# Patient Record
Sex: FEMALE | Race: ASIAN | Employment: FULL TIME | URBAN - METROPOLITAN AREA
[De-identification: names, ages, dates, MRNs, and addresses within clinical notes are randomized per-mention and may not be internally consistent; named-entity substitution may affect disease eponyms.]

---

## 2019-10-18 ENCOUNTER — OFFICE VISIT (OUTPATIENT)
Dept: FAMILY MEDICINE CLINIC | Age: 49
End: 2019-10-18

## 2019-10-18 VITALS
HEIGHT: 60 IN | HEART RATE: 68 BPM | SYSTOLIC BLOOD PRESSURE: 119 MMHG | OXYGEN SATURATION: 98 % | WEIGHT: 130 LBS | TEMPERATURE: 96.2 F | RESPIRATION RATE: 16 BRPM | DIASTOLIC BLOOD PRESSURE: 76 MMHG | BODY MASS INDEX: 25.52 KG/M2

## 2019-10-18 DIAGNOSIS — M54.50 ACUTE RIGHT-SIDED LOW BACK PAIN WITHOUT SCIATICA: ICD-10-CM

## 2019-10-18 DIAGNOSIS — Z13.29 SCREENING FOR ENDOCRINE, NUTRITIONAL, METABOLIC AND IMMUNITY DISORDER: ICD-10-CM

## 2019-10-18 DIAGNOSIS — Z13.0 SCREENING FOR ENDOCRINE, NUTRITIONAL, METABOLIC AND IMMUNITY DISORDER: ICD-10-CM

## 2019-10-18 DIAGNOSIS — Z00.00 PHYSICAL EXAM: Primary | ICD-10-CM

## 2019-10-18 DIAGNOSIS — Z12.31 VISIT FOR SCREENING MAMMOGRAM: ICD-10-CM

## 2019-10-18 DIAGNOSIS — Z13.220 SCREENING CHOLESTEROL LEVEL: ICD-10-CM

## 2019-10-18 DIAGNOSIS — Z13.21 SCREENING FOR ENDOCRINE, NUTRITIONAL, METABOLIC AND IMMUNITY DISORDER: ICD-10-CM

## 2019-10-18 DIAGNOSIS — Z83.3 FH: DIABETES MELLITUS: ICD-10-CM

## 2019-10-18 DIAGNOSIS — Z12.12 SCREENING FOR COLORECTAL CANCER: ICD-10-CM

## 2019-10-18 DIAGNOSIS — Z13.228 SCREENING FOR ENDOCRINE, NUTRITIONAL, METABOLIC AND IMMUNITY DISORDER: ICD-10-CM

## 2019-10-18 DIAGNOSIS — Z12.11 SCREENING FOR COLORECTAL CANCER: ICD-10-CM

## 2019-10-18 LAB
BILIRUB UR QL STRIP: NEGATIVE
GLUCOSE UR-MCNC: NEGATIVE MG/DL
KETONES P FAST UR STRIP-MCNC: NEGATIVE MG/DL
PH UR STRIP: 5 [PH] (ref 4.6–8)
PROT UR QL STRIP: NEGATIVE
SP GR UR STRIP: 1.03 (ref 1–1.03)
UA UROBILINOGEN AMB POC: NORMAL (ref 0.2–1)
URINALYSIS CLARITY POC: CLEAR
URINALYSIS COLOR POC: NORMAL
URINE BLOOD POC: NEGATIVE
URINE LEUKOCYTES POC: NEGATIVE
URINE NITRITES POC: NEGATIVE

## 2019-10-18 RX ORDER — CYCLOBENZAPRINE HCL 5 MG
5 TABLET ORAL
Qty: 30 TAB | Refills: 0 | Status: SHIPPED | OUTPATIENT
Start: 2019-10-18 | End: 2021-06-10

## 2019-10-18 RX ORDER — NAPROXEN 500 MG/1
500 TABLET ORAL 2 TIMES DAILY WITH MEALS
Qty: 60 TAB | Refills: 0 | Status: SHIPPED | OUTPATIENT
Start: 2019-10-18

## 2019-10-18 NOTE — PROGRESS NOTES
Chief Complaint   Patient presents with   [facility] Establish Care    LOW BACK PAIN     lower right back pain for about a month     1. Have you been to the ER, urgent care clinic since your last visit? Hospitalized since your last visit? No    2. Have you seen or consulted any other health care providers outside of the [address] [place] [address] since your last visit? Include any pap smears or colon screening.  No

## 2019-10-18 NOTE — PROGRESS NOTES
HISTORY OF PRESENT ILLNESS  Cee De Jesus is a 47 y.o. female. Patient is here to establish care. She is due to have a physical exam. I have discussed ordering mammogram and FIT kit. She would also like to have some blood work done. Patient mentions that she has started a job at [employer] and in the last 3 weeks she has been having lower back pain more so on the right side. I have discussed ordering a x-ray of lumbar spine today. I have also discussed intermittent use of heating pads and anti-inflammatory if needed. Establish Care   The history is provided by the patient. This is a new problem. The problem occurs constantly. The problem has not changed since onset. Pertinent negatives include no chest pain, no abdominal pain, no headaches and no shortness of breath. The symptoms are aggravated by bending, standing and exertion. Nothing relieves the symptoms. She has tried nothing for the symptoms. LOW BACK PAIN   The history is provided by the patient. This is a chronic problem. The problem occurs constantly. The problem has been gradually worsening. Pertinent negatives include no chest pain, no abdominal pain, no headaches and no shortness of breath. The symptoms are aggravated by stress, walking, standing and exertion. Nothing relieves the symptoms. She has tried nothing for the symptoms. Review of Systems   Constitutional: Negative for diaphoresis, fever and malaise/fatigue. HENT: Negative for congestion, ear discharge, ear pain, hearing loss, sinus pain and sore throat. Eyes: Negative for blurred vision, double vision, pain and discharge. Respiratory: Negative for cough, sputum production, shortness of breath and wheezing. Cardiovascular: Negative for chest pain, palpitations and leg swelling. Gastrointestinal: Positive for heartburn. Negative for abdominal pain, blood in stool, constipation, nausea and vomiting. Genitourinary: Negative for dysuria, frequency and hematuria. Musculoskeletal: Positive for back pain and joint pain. Neurological: Negative for tingling, focal weakness, weakness and headaches. Endo/Heme/Allergies: Negative for environmental allergies. Psychiatric/Behavioral: Negative for depression and memory loss. The patient is not nervous/anxious and does not have insomnia. Visit Vitals  /76   Pulse 68   Temp 96.2 °F (35.7 °C) (Oral)   Resp 16   Ht 5' (1.524 m)   Wt 130 lb (59 kg)   SpO2 98%   BMI 25.39 kg/m²       Physical Exam   Constitutional: She is oriented to person, place, and time. She appears well-developed and well-nourished. No distress. HENT:   Head: Normocephalic and atraumatic. Right Ear: External ear normal.   Left Ear: External ear normal.   Mouth/Throat: Oropharynx is clear and moist. No oropharyngeal exudate. Eyes: Pupils are equal, round, and reactive to light. EOM are normal. No scleral icterus. Neck: Normal range of motion. No thyromegaly present. Cardiovascular: Normal rate, regular rhythm and normal heart sounds. Pulmonary/Chest: Effort normal and breath sounds normal. No respiratory distress. She has no wheezes. Abdominal: Soft. Bowel sounds are normal. She exhibits no distension. There is no tenderness. Musculoskeletal: She exhibits tenderness. Lumbar back: She exhibits decreased range of motion, tenderness, pain and spasm. Back:    Lymphadenopathy:     She has no cervical adenopathy. Neurological: She is alert and oriented to person, place, and time. Psychiatric: She has a normal mood and affect. ASSESSMENT and PLAN  Physical exam :  1) Please make sure you have a routine physical exam every 1-2 years. 2) Annual check up with eye doctor and dentist.  3) Annual mammograms for all females starting at age of 36.  3) Self breast exam every month starting at age of 21 and above.   5) Clinical breast exam to be done every 3 years for woman between 20-30 and every year for all woman 36 and above. 6) Pap smear every 3 years starting at age 24( between 24- 27 it may be more often). At the age of 27 to 72  can switch to every 5 years with HPV screening. Woman over the age of 72 with regular cervical cancer testing with normal results no longer need testing. 7) Colorectal cancer screening with colonoscopy every ten years. 8) Bone density testing starting at the age of 72.   5) Routine blood work to be ordered as part of physical exam and has been discussed with patient. 10) Screening for STD's/HIV. 11) Exercise at least 30 min 3-5 times a week for goal BMI of less than or equal to 25.  12) Please make sure you wear a seat belt while driving daily , helmet safety discussed. 13) Please avoid smoking , alcohol and illicit drug use. 14) Daily requirement of calcium is 1200 mg per day and 1000 IU of vitamin D.  15) Please make sure all immunizations are up to date:       - Influenza vaccine every year        - Tdap every 10 years       - Pneumococcal vaccine starting at age of 72       - Shingles at age 61     Discussed mammogram and has been ordered  Fit testing has been ordered    Lower back pain :  1) Please stop heavy weight lifting and aggressive exercise for a few days to allow healing to damaged nerve roots but it is important to maintain exercises to build strength and flexibility. 2) Consider physical therapy for 6-8 weeks before exploring further treatment options. 3) Ok to use hot /cold packs depending on your body. 4) Discussed the use of pain medication and muscle relaxants , please do not take more than the reccommended amount prescribed. 5) Please have x-ray of the lower back done.

## 2019-10-19 LAB
25(OH)D3+25(OH)D2 SERPL-MCNC: 19.4 NG/ML (ref 30–100)
ALBUMIN SERPL-MCNC: 4 G/DL (ref 3.5–5.5)
ALBUMIN/GLOB SERPL: 1.5 {RATIO} (ref 1.2–2.2)
ALP SERPL-CCNC: 110 IU/L (ref 39–117)
ALT SERPL-CCNC: 12 IU/L (ref 0–32)
AST SERPL-CCNC: 13 IU/L (ref 0–40)
BASOPHILS # BLD AUTO: 0 X10E3/UL (ref 0–0.2)
BASOPHILS NFR BLD AUTO: 0 %
BILIRUB SERPL-MCNC: 0.4 MG/DL (ref 0–1.2)
BUN SERPL-MCNC: 10 MG/DL (ref 6–24)
BUN/CREAT SERPL: 19 (ref 9–23)
CALCIUM SERPL-MCNC: 8.8 MG/DL (ref 8.7–10.2)
CHLORIDE SERPL-SCNC: 104 MMOL/L (ref 96–106)
CHOLEST SERPL-MCNC: 155 MG/DL (ref 100–199)
CO2 SERPL-SCNC: 24 MMOL/L (ref 20–29)
CREAT SERPL-MCNC: 0.52 MG/DL (ref 0.57–1)
EOSINOPHIL # BLD AUTO: 0.1 X10E3/UL (ref 0–0.4)
EOSINOPHIL NFR BLD AUTO: 1 %
ERYTHROCYTE [DISTWIDTH] IN BLOOD BY AUTOMATED COUNT: 12.9 % (ref 12.3–15.4)
EST. AVERAGE GLUCOSE BLD GHB EST-MCNC: 120 MG/DL
GLOBULIN SER CALC-MCNC: 2.7 G/DL (ref 1.5–4.5)
GLUCOSE SERPL-MCNC: 85 MG/DL (ref 65–99)
HBA1C MFR BLD: 5.8 % (ref 4.8–5.6)
HCT VFR BLD AUTO: 37.4 % (ref 34–46.6)
HDLC SERPL-MCNC: 47 MG/DL
HGB BLD-MCNC: 12.3 G/DL (ref 11.1–15.9)
IMM GRANULOCYTES # BLD AUTO: 0 X10E3/UL (ref 0–0.1)
IMM GRANULOCYTES NFR BLD AUTO: 0 %
LDLC SERPL CALC-MCNC: 95 MG/DL (ref 0–99)
LYMPHOCYTES # BLD AUTO: 1.5 X10E3/UL (ref 0.7–3.1)
LYMPHOCYTES NFR BLD AUTO: 21 %
MCH RBC QN AUTO: 29.1 PG (ref 26.6–33)
MCHC RBC AUTO-ENTMCNC: 32.9 G/DL (ref 31.5–35.7)
MCV RBC AUTO: 88 FL (ref 79–97)
MONOCYTES # BLD AUTO: 0.6 X10E3/UL (ref 0.1–0.9)
MONOCYTES NFR BLD AUTO: 8 %
NEUTROPHILS # BLD AUTO: 5.1 X10E3/UL (ref 1.4–7)
NEUTROPHILS NFR BLD AUTO: 70 %
PLATELET # BLD AUTO: 271 X10E3/UL (ref 150–450)
POTASSIUM SERPL-SCNC: 4.7 MMOL/L (ref 3.5–5.2)
PROT SERPL-MCNC: 6.7 G/DL (ref 6–8.5)
RBC # BLD AUTO: 4.23 X10E6/UL (ref 3.77–5.28)
SODIUM SERPL-SCNC: 141 MMOL/L (ref 134–144)
TRIGL SERPL-MCNC: 63 MG/DL (ref 0–149)
TSH SERPL DL<=0.005 MIU/L-ACNC: 4.37 UIU/ML (ref 0.45–4.5)
VLDLC SERPL CALC-MCNC: 13 MG/DL (ref 5–40)
WBC # BLD AUTO: 7.4 X10E3/UL (ref 3.4–10.8)

## 2019-10-21 ENCOUNTER — TELEPHONE (OUTPATIENT)
Dept: FAMILY MEDICINE CLINIC | Age: 49
End: 2019-10-21

## 2019-10-24 ENCOUNTER — TELEPHONE (OUTPATIENT)
Dept: FAMILY MEDICINE CLINIC | Age: 49
End: 2019-10-24

## 2019-10-24 RX ORDER — ERGOCALCIFEROL 1.25 MG/1
50000 CAPSULE ORAL
Qty: 4 CAP | Refills: 2 | Status: SHIPPED | OUTPATIENT
Start: 2019-10-24 | End: 2020-01-22

## 2020-01-27 DIAGNOSIS — Z12.31 VISIT FOR SCREENING MAMMOGRAM: ICD-10-CM

## 2020-11-18 LAB — MAMMOGRAPHY, EXTERNAL: NORMAL

## 2021-06-10 ENCOUNTER — OFFICE VISIT (OUTPATIENT)
Dept: FAMILY MEDICINE CLINIC | Age: 51
End: 2021-06-10
Payer: MEDICAID

## 2021-06-10 VITALS
DIASTOLIC BLOOD PRESSURE: 72 MMHG | TEMPERATURE: 98.2 F | WEIGHT: 144.2 LBS | OXYGEN SATURATION: 98 % | HEART RATE: 61 BPM | RESPIRATION RATE: 18 BRPM | BODY MASS INDEX: 28.16 KG/M2 | SYSTOLIC BLOOD PRESSURE: 129 MMHG

## 2021-06-10 DIAGNOSIS — N95.1 PERIMENOPAUSE: ICD-10-CM

## 2021-06-10 DIAGNOSIS — R73.03 PREDIABETES: ICD-10-CM

## 2021-06-10 DIAGNOSIS — M54.2 NECK PAIN: Primary | ICD-10-CM

## 2021-06-10 DIAGNOSIS — Z12.11 COLON CANCER SCREENING: ICD-10-CM

## 2021-06-10 DIAGNOSIS — Z11.59 NEED FOR HEPATITIS C SCREENING TEST: ICD-10-CM

## 2021-06-10 DIAGNOSIS — E55.9 VITAMIN D DEFICIENCY: ICD-10-CM

## 2021-06-10 DIAGNOSIS — Z76.89 ENCOUNTER TO ESTABLISH CARE: ICD-10-CM

## 2021-06-10 PROBLEM — M51.36 DEGENERATION OF LUMBAR INTERVERTEBRAL DISC: Status: ACTIVE | Noted: 2020-03-18

## 2021-06-10 PROBLEM — M51.26 DISPLACEMENT OF LUMBAR INTERVERTEBRAL DISC WITHOUT MYELOPATHY: Status: ACTIVE | Noted: 2020-03-18

## 2021-06-10 PROCEDURE — 99214 OFFICE O/P EST MOD 30 MIN: CPT | Performed by: NURSE PRACTITIONER

## 2021-06-10 RX ORDER — EMOLLIENT COMBINATION NO.110
LOTION (ML) TOPICAL
COMMUNITY
End: 2022-09-19

## 2021-06-10 NOTE — PROGRESS NOTES
[address] [place] [address]               [phone]      Breana Cobos is a 45 y.o. female and presents with Establish Care       Assessment/Plan:    Diagnoses and all orders for this visit:    1. Neck pain  -     REFERRAL TO PHYSICAL THERAPY  Endorses a problem of lower neck upper back pain that has been ongoing for 3 to 4 months, [facility]-she brought those notes with her today, the x-rays showed mild degenerative disc disease C-spine and inflammation  We will start with conservative therapy continuing with use of NSAIDs, heat, ice and referring her to formal physical therapy in addition handout on home exercises and stretches she can perform have been provided in the after visit summary  2. Prediabetes  -     HEMOGLOBIN A1C WITH EAG; Future  -     METABOLIC PANEL, COMPREHENSIVE; Future  She has a past medical history of prediabetes, labs today to follow-up  3. Perimenopause  -     METABOLIC PANEL, COMPREHENSIVE; Future  -     CBC W/O DIFF; Future  She is endorsing some signs and symptoms today consistent with perimenopause, will obtain labs to evaluate her hepatic and renal function in addition to checking for anemia  4. Vitamin D deficiency  -     VITAMIN D, 25 HYDROXY; Future  Follow-up labs today  5. Need for hepatitis C screening test  -     HEPATITIS C AB; Future  Health maintenance needs addressed  6. Colon cancer screening  -     OCCULT BLOOD IMMUNOASSAY,DIAGNOSTIC; Future  Health maintenance needs addressed  7. Encounter to establish care  Visit today is to establish care  Other orders  -     METABOLIC PANEL, COMPREHENSIVE  -     CBC W/O DIFF  -     HEMOGLOBIN A1C WITH EAG  -     VITAMIN D, 25 HYDROXY  -     HEPATITIS C AB  -     OCCULT BLOOD IMMUNOASSAY,DIAGNOSTIC        Follow-up and Dispositions    · Return in about 6 months (around 12/10/2021) for PAP, vit d deficeincy, pre diabetes, office only, 30 min. Subjective:    Neck pain  Onset: 3-4 months ago  [facility], [facility], mild degenerative disc disease c-spine, showed inflammation  Recommended posture correction, Rx naproxen  Location: pain is at base of c-spine/upper t-spine,  works at [employer] and removes large boxes overhead, keeps her head leaned forward frequently while working, has since quit  Rated 3-4/10,   Aggrivating: looking down and some when looking up  Reliving: tylenol, naproxen, ice  Has not tried stretches    ROS:     ROS  As stated in HPI, otherwise all others negative. The problem list was updated as a part of today's visit. Patient Active Problem List   Diagnosis Code    Prediabetes R73.03    Vitamin D deficiency E55.9    Degeneration of lumbar intervertebral disc M51.36    Displacement of lumbar intervertebral disc without myelopathy M51.26    Perimenopause N95.1       The PSH, FH were reviewed. SH:  Social History     Tobacco Use    Smoking status: Never Smoker    Smokeless tobacco: Never Used   Substance Use Topics    Alcohol use: Never    Drug use: Never       Medications/Allergies:  Current Outpatient Medications on File Prior to Visit   Medication Sig Dispense Refill    emollient combination no.72 (Eucerin Intensive Repair) lotn Eucerin Intensive Repair lotion   apply to affected area twice a day      naproxen (NAPROSYN) 500 mg tablet Take 1 Tab by mouth two (2) times daily (with meals). 60 Tab 0     No current facility-administered medications on file prior to visit. No Known Allergies    Objective:  Visit Vitals  /72   Pulse 61   Temp 98.2 °F (36.8 °C) (Temporal)   Resp 18   Wt 144 lb 3.2 oz (65.4 kg)   SpO2 98%   BMI 28.16 kg/m²    Body mass index is 28.16 kg/m². Physical assessment  Physical Exam  Vitals and nursing note reviewed. Constitutional:       Appearance: Normal appearance. HENT:      Head: Normocephalic and atraumatic.       Right Ear: Hearing, tympanic membrane, ear canal and external ear normal.      Left Ear: Hearing, tympanic membrane, ear canal and external ear normal.      Mouth/Throat:      Mouth: Mucous membranes are moist.      Tongue: No lesions. Tongue does not deviate from midline. Pharynx: Oropharynx is clear. Eyes:      General: Lids are normal.      Conjunctiva/sclera: Conjunctivae normal.      Pupils: Pupils are equal, round, and reactive to light. Neck:      Thyroid: No thyroid mass, thyromegaly or thyroid tenderness. Vascular: No JVD. Cardiovascular:      Rate and Rhythm: Normal rate and regular rhythm. Heart sounds: Normal heart sounds. No murmur heard. No friction rub. No gallop. Pulmonary:      Effort: Pulmonary effort is normal.      Breath sounds: Normal breath sounds. Abdominal:      General: Abdomen is flat. Bowel sounds are normal.      Palpations: Abdomen is soft. Tenderness: There is no abdominal tenderness. Musculoskeletal:         General: Normal range of motion. Cervical back: Normal range of motion. Back:    Skin:     General: Skin is warm and dry. Neurological:      Mental Status: She is alert and oriented to person, place, and time. Psychiatric:         Attention and Perception: Attention normal.         Mood and Affect: Mood and affect normal.         Speech: Speech normal.         Behavior: Behavior is cooperative.          Cognition and Memory: Memory normal.         Judgment: Judgment normal.           Labwork and Ancillary Studies:    CBC w/Diff  Lab Results   Component Value Date/Time    WBC 7.4 06/10/2021 10:15 AM    HGB 11.8 06/10/2021 10:15 AM    PLATELET 171 38/24/3426 10:15 AM         Basic Metabolic Profile  Lab Results   Component Value Date/Time    Sodium 142 06/10/2021 10:15 AM    Potassium 4.7 06/10/2021 10:15 AM    Chloride 105 06/10/2021 10:15 AM    CO2 25 06/10/2021 10:15 AM    Glucose 90 06/10/2021 10:15 AM    BUN 10 06/10/2021 10:15 AM    Creatinine 0.60 06/10/2021 10:15 AM    BUN/Creatinine ratio 17 06/10/2021 10:15 AM    GFR est  06/10/2021 10:15 AM    GFR est non- 06/10/2021 10:15 AM    Calcium 9.0 06/10/2021 10:15 AM        Cholesterol  Lab Results   Component Value Date/Time    Cholesterol, total 155 10/18/2019 09:37 AM    HDL Cholesterol 47 10/18/2019 09:37 AM    LDL, calculated 95 10/18/2019 09:37 AM    Triglyceride 63 10/18/2019 09:37 AM       Health Maintenance:   Health Maintenance   Topic Date Due    PAP AKA CERVICAL CYTOLOGY  Never done    Shingrix Vaccine Age 50> (1 of 2) Never done    Flu Vaccine (Season Ended) 09/01/2021    A1C test (Diabetic or Prediabetic)  06/10/2022    Colorectal Cancer Screening Combo  06/11/2022    Breast Cancer Screen Mammogram  11/18/2022    Lipid Screen  10/18/2024    DTaP/Tdap/Td series (2 - Td or Tdap) 03/17/2031    Hepatitis C Screening  Completed    COVID-19 Vaccine  Completed    Pneumococcal 0-64 years  Aged Out       I have discussed the diagnosis with the patient and the intended plan as seen in the above orders. The patient has received an After-Visit Summary and questions were answered concerning future plans. An After Visit Summary was printed and given to the patient. All diagnosis have been discussed with the patient and all of the patient's questions have been answered. Follow-up and Dispositions    · Return in about 6 months (around 12/10/2021) for PAP, vit d deficeincy, pre diabetes, office only, 30 min. Subha Kearney, WES-BC  [address] [facility]   [address] [facility] [address]  [id number]

## 2021-06-10 NOTE — PATIENT INSTRUCTIONS
1. Between Shoulder Blades  shoudler blade ball  Instructions:    Position your body over the massage ball as to target the muscles between the shoulder blades. Apply an appropriate amount of pressure onto the ball. Slowly [place] around the target area and pause at any areas that illicit more tenderness. Once you find a tender spot, move you arm up/down to increase the release. Repeat on other side. 2. Chest  ball chest  Instructions:    Position your body over a massage ball so that your chest muscles are targeted. Apply appropriate amount of pressure onto the ball. Slowly [place] around the target area and pause at any areas that illicit more tenderness. Once you have found a painful spot, move your arm around to increase the amount of release in the area. Repeat on other side. 3. Latissimus Dorsi  latfoam  Instructions:    Place a foam roller on the floor. Position your body on top of the foam roller so that it is in direct contact with the latissimus dorsi. (see above)  Apply an appropriate amount of pressure onto the foam roller. Slowly move around the target area and pause at any areas that illicit more tenderness. You may want to move your arm around to increase the release. Repeat on other side. 4. Upper Abdominals  ball upper abs    Instructions:    Position your body over a massage ball as to target the upper abdominal region. Apply a small amount of pressure onto the ball. Slowly [place] around the target area and pause at any areas that illicit more tenderness. Note: Please take care with this release. Excessive pressure in this region can compress your organs! Repeat on other side. 5. Intercostals  Instructions: With your finger, feel the gaps between your ribs. Starting from the outer side of your ribs, trace this gap towards the mid line. Apply appropriate amount of pressure through your finger tip.   Once you have found a tender area, maintain the finger tip pressure and take 3x breaths in/out. Make sure to cover as many ribs as you can locate. B) Stretches  Now that you have released your muscles that influence the thoracic spine, the next step is to stretch! Guideline:    Hold each stretch for a minimum of 30-45 seconds. Repeat 2-3 times. Make sure you feel the stretch in the targeted areas. 6. Side Stretch  Side stretch  This stretch predominantly targets the latissimus dorsi muscle. Instructions: Whilst standing, reach over and bend to the side. Aim to feel the stretch on the side of your body. Do not let your torso rotate. Repeat on the other side. 7. Front Stretch  Chest stretch    This stretch predominantly targets the pectoralis (chest) muscle. Instructions:    Place your outstretched hand on a door frame. Lunge forward. Pull your shoulder blades backwards. Do not arch your lower back. (Keep rib cage low.)  Aim to feel a stretch at the front of your chest.  Repeat on the other side. 8. Intercostals  These muscles are situated between the ribs. (Surprisingly, they can actually get pretty tight!)    intercostal stretch   I call this my sexy pose :)    Instructions:    Lie on your side whilst leaning on your elbow  [place] your body to the upper side. Aim to feel a stretch at the side of your rib cage. Aim to take deep breaths into the area of stretch. Repeat on the other side. 9. Posterior Line Stretch  Posterior line stretch    Instructions: Whilst sitting down, pull your head down and bring your chin closer to your upper chest.  Bend as far forward as possible whilst making sure to round the upper back. Aim to feel a stretch at the back between the shoulder blades. Take deep breaths into the area of stretch. C) Mobility  If you have completed the releases and stretches, your thoracic spine should feel much looser now.  Lets get moving! Note:    You may feel/hear clicks when you perform these mobility exercises.   (This is just the release of pressure in your joints.)  Aim to move your spine as much as possible without compensating with other joints. 10. Extension  Thoracic extension    Instructions:    Place a foam roller (a rolled up towel will work too) on the floor. Lie down on the ground and position the foam roll so that it is in the middle of your upper back. Stretch arms over head and arch backwards. Keep your lower ribs down to prevent over arching of the lower back. Oscillate this motion for 30 repetitions. 11. Flexion  flat round back  Instructions:    Get into the 4 point kneel position. Proceed to round your upper back. Aim to feel a gentle stretch at the back as your elongate your spine. Return to the starting position. Alternate between these 2 positions for 30 repetitions. Progression: Try to round your upper back one vertebra at a time. (aka Segmental control)  12. Rotation  rotation  Instructions:    Sit down on a chair. Place your hand on the outer side of the opposite knee  With the other hand, grab onto the back of the chair. Rotate your spine. (as to look behind you)  Oscillate in this position for 30 repetitions. Repeat on the other side. 13. Translations  thoracic translation  Instructions: Whilst sitting, slide your shoulders to the side. Aim to keep your shoulders level whilst performing this movement. Alternate sides for 30 repetitions. D) Strengthening  14. Wall Nando  nando 1 nando 2  Instructions:    Stand with your back to a wall. Keep your back and arms pulled backwards as to remain in contact with the wall throughout movements. Place your arms in the W starting position. Transition to I position. Aim to feel the contraction in the muscles between the shoulder blade. Repeat 10-20 times. 15. Parallel Nando  horizontal retraction  Instructions:    Support your chest on a stool. (as to keep your body parallel with the floor)  Place your arms in the W starting position.   Transition to I position. Keep your back muscles pulled backwards throughout all movements. Repeat 10-20 times. 16. Side Bends  Instructions: Whilst standing, hold onto weights in your hands. Use an appropriate weight. Proceed to bend all the way to the side. Make sure that you do not twist your body. Hold for 5 seconds. Alternate sides. Repeat 20 times. Note: Increase the weight when you are ready to progress the exercise. 17. Rotation In 4 Point Kneel  Instructions:    Get into the 4 point kneel position. Place one hand behind your head. Proceed to twist your body. Aim to get the elbow pointing towards the roof. Gently brace your abdominal muscles. Keep your ribs cage low. Do not flare you ribs out. Hold for 5 seconds. Repeat 20 times. Repeat exercise on the other side. Neck Arthritis: Exercises  Introduction  Here are some examples of exercises for you to try. The exercises may be suggested for a condition or for rehabilitation. Start each exercise slowly. Ease off the exercises if you start to have pain. You will be told when to start these exercises and which ones will work best for you. How to do the exercises  Neck stretches to the side   1. This stretch works best if you keep your shoulder down as you lean away from it. To help you remember to do this, start by relaxing your shoulders and lightly holding on to your thighs or your chair. 2. Tilt your head toward your shoulder and hold for 15 to 30 seconds. Let the weight of your head stretch your muscles. 3. Repeat 2 to 4 times toward each shoulder. Chin tuck   1. Lie on the floor with a rolled-up towel under your neck. Your head should be touching the floor. 2. Slowly bring your chin toward your chest.  3. Hold for a count of 6, and then relax for up to 10 seconds. 4. Repeat 8 to 12 times. Active cervical rotation   1. Sit in a firm chair, or stand up straight.   2. Keeping your chin level, turn your head to the right, and hold for 15 to 30 seconds. 3. Turn your head to the left and hold for 15 to 30 seconds. 4. Repeat 2 to 4 times to each side. Shoulder blade squeeze   1. While standing, squeeze your shoulder blades together. 2. Do not raise your shoulders up as you are squeezing. 3. Hold for 6 seconds. 4. Repeat 8 to 12 times. Shoulder rolls   1. Sit comfortably with your feet shoulder-width apart. You can also do this exercise standing up. 2. Roll your shoulders up, then back, and then down in a smooth, circular motion. 3. Repeat 2 to 4 times. Follow-up care is a key part of your treatment and safety. Be sure to make and go to all appointments, and call your doctor if you are having problems. It's also a good idea to know your test results and keep a list of the medicines you take. Where can you learn more? Go to http://www.gray.com/  Enter X003 in the search box to learn more about \"Neck Arthritis: Exercises. \"  Current as of: November 16, 2020               Content Version: 12.8  © 8542-9268 [employer]. Care instructions adapted under license by [employer] (which disclaims liability or warranty for this information). If you have questions about a medical condition or this instruction, always ask your healthcare professional. [address] any warranty or liability for your use of this information. Neck Arthritis: Exercises  Introduction  Here are some examples of exercises for you to try. The exercises may be suggested for a condition or for rehabilitation. Start each exercise slowly. Ease off the exercises if you start to have pain. You will be told when to start these exercises and which ones will work best for you. How to do the exercises  Neck stretches to the side   4. This stretch works best if you keep your shoulder down as you lean away from it.  To help you remember to do this, start by relaxing your shoulders and lightly holding on to your thighs or your chair. 5. Tilt your head toward your shoulder and hold for 15 to 30 seconds. Let the weight of your head stretch your muscles. 6. Repeat 2 to 4 times toward each shoulder. Chin tuck   5. Lie on the floor with a rolled-up towel under your neck. Your head should be touching the floor. 6. Slowly bring your chin toward your chest.  7. Hold for a count of 6, and then relax for up to 10 seconds. 8. Repeat 8 to 12 times. Active cervical rotation   5. Sit in a firm chair, or stand up straight. 6. Keeping your chin level, turn your head to the right, and hold for 15 to 30 seconds. 7. Turn your head to the left and hold for 15 to 30 seconds. 8. Repeat 2 to 4 times to each side. Shoulder blade squeeze   5. While standing, squeeze your shoulder blades together. 6. Do not raise your shoulders up as you are squeezing. 7. Hold for 6 seconds. 8. Repeat 8 to 12 times. Shoulder rolls   4. Sit comfortably with your feet shoulder-width apart. You can also do this exercise standing up. 5. Roll your shoulders up, then back, and then down in a smooth, circular motion. 6. Repeat 2 to 4 times. Follow-up care is a key part of your treatment and safety. Be sure to make and go to all appointments, and call your doctor if you are having problems. It's also a good idea to know your test results and keep a list of the medicines you take. Where can you learn more? Go to http://adarsh-michael.info/  Enter U895 in the search box to learn more about \"Neck Arthritis: Exercises. \"  Current as of: November 16, 2020               Content Version: 12.8  © 4184-2146 [employer]. Care instructions adapted under license by [employer] (which disclaims liability or warranty for this information).  If you have questions about a medical condition or this instruction, always ask your healthcare professional. [employer] disclaims any warranty or liability for your use of this information.

## 2021-06-11 LAB
25(OH)D3+25(OH)D2 SERPL-MCNC: 20.6 NG/ML (ref 30–100)
ALBUMIN SERPL-MCNC: 4.1 G/DL (ref 3.8–4.9)
ALBUMIN/GLOB SERPL: 1.2 {RATIO} (ref 1.2–2.2)
ALP SERPL-CCNC: 113 IU/L (ref 48–121)
ALT SERPL-CCNC: 12 IU/L (ref 0–32)
AST SERPL-CCNC: 15 IU/L (ref 0–40)
BILIRUB SERPL-MCNC: 0.3 MG/DL (ref 0–1.2)
BUN SERPL-MCNC: 10 MG/DL (ref 6–24)
BUN/CREAT SERPL: 17 (ref 9–23)
CALCIUM SERPL-MCNC: 9 MG/DL (ref 8.7–10.2)
CHLORIDE SERPL-SCNC: 105 MMOL/L (ref 96–106)
CO2 SERPL-SCNC: 25 MMOL/L (ref 20–29)
CREAT SERPL-MCNC: 0.6 MG/DL (ref 0.57–1)
ERYTHROCYTE [DISTWIDTH] IN BLOOD BY AUTOMATED COUNT: 13.2 % (ref 11.7–15.4)
EST. AVERAGE GLUCOSE BLD GHB EST-MCNC: 123 MG/DL
GLOBULIN SER CALC-MCNC: 3.3 G/DL (ref 1.5–4.5)
GLUCOSE SERPL-MCNC: 90 MG/DL (ref 65–99)
HBA1C MFR BLD: 5.9 % (ref 4.8–5.6)
HCT VFR BLD AUTO: 37 % (ref 34–46.6)
HCV AB S/CO SERPL IA: <0.1 S/CO RATIO (ref 0–0.9)
HGB BLD-MCNC: 11.8 G/DL (ref 11.1–15.9)
MCH RBC QN AUTO: 27.9 PG (ref 26.6–33)
MCHC RBC AUTO-ENTMCNC: 31.9 G/DL (ref 31.5–35.7)
MCV RBC AUTO: 88 FL (ref 79–97)
PLATELET # BLD AUTO: 304 X10E3/UL (ref 150–450)
POTASSIUM SERPL-SCNC: 4.7 MMOL/L (ref 3.5–5.2)
PROT SERPL-MCNC: 7.4 G/DL (ref 6–8.5)
RBC # BLD AUTO: 4.23 X10E6/UL (ref 3.77–5.28)
SODIUM SERPL-SCNC: 142 MMOL/L (ref 134–144)
WBC # BLD AUTO: 7.4 X10E3/UL (ref 3.4–10.8)

## 2021-06-18 LAB — HEMOCCULT STL QL IA: NEGATIVE

## 2021-09-23 DIAGNOSIS — E55.9 VITAMIN D DEFICIENCY: Primary | ICD-10-CM

## 2021-09-23 RX ORDER — ASPIRIN 325 MG
50000 TABLET, DELAYED RELEASE (ENTERIC COATED) ORAL
Qty: 12 CAPSULE | Refills: 1 | Status: SHIPPED | OUTPATIENT
Start: 2021-09-23 | End: 2022-09-19

## 2021-09-24 ENCOUNTER — TELEPHONE (OUTPATIENT)
Dept: FAMILY MEDICINE CLINIC | Age: 51
End: 2021-09-24

## 2021-09-24 NOTE — TELEPHONE ENCOUNTER
Mr. Haydee Campos was called in regards Ms. Sung Max lab results. No answer. Left message to return call for lab results.

## 2021-11-01 ENCOUNTER — OFFICE VISIT (OUTPATIENT)
Dept: FAMILY MEDICINE CLINIC | Age: 51
End: 2021-11-01
Payer: MEDICAID

## 2021-11-01 VITALS
DIASTOLIC BLOOD PRESSURE: 71 MMHG | SYSTOLIC BLOOD PRESSURE: 114 MMHG | BODY MASS INDEX: 28.03 KG/M2 | HEART RATE: 70 BPM | OXYGEN SATURATION: 98 % | TEMPERATURE: 98.4 F | WEIGHT: 142.8 LBS | RESPIRATION RATE: 18 BRPM | HEIGHT: 60 IN

## 2021-11-01 DIAGNOSIS — B07.0 PLANTAR WART: Primary | ICD-10-CM

## 2021-11-01 PROCEDURE — 99213 OFFICE O/P EST LOW 20 MIN: CPT | Performed by: NURSE PRACTITIONER

## 2021-11-01 NOTE — PROGRESS NOTES
Room    Did patient bring someone? Yes: Comment: Son in law meet cantu    Did the patient have DME equipment? No     Did you take your medication today? No       1. Have you been to the ER, urgent care clinic since your last visit? Hospitalized since your last visit? No    2. Have you seen or consulted any other health care providers outside of the [address] [place] [address] since your last visit? Include any pap smears or colon screening.  No      Health Maintenance Due   Topic Date Due    Cervical cancer screen  Never done    Shingrix Vaccine Age 50> (1 of 2) Never done    Flu Vaccine (1) 09/01/2021

## 2021-11-01 NOTE — PROGRESS NOTES
[address] [place] [address]               [phone]      Juice Martin is a 45 y.o. female and presents with No chief complaint on file. Assessment/Plan:    Diagnoses and all orders for this visit:    1. Plantar wart  -     REFERRAL TO PODIATRY    referral to podiatry for further management  Patient verbalized understanding and is in agreement with this plan of care    Follow-up and Dispositions    · Return for keep previously scheduled follow up. Health Maintenance:   Health Maintenance   Topic Date Due    Cervical cancer screen  Never done    Shingrix Vaccine Age 49> (1 of 2) Never done    Flu Vaccine (1) 09/01/2021    A1C test (Diabetic or Prediabetic)  06/10/2022    Colorectal Cancer Screening Combo  06/11/2022    Breast Cancer Screen Mammogram  11/18/2022    Lipid Screen  10/18/2024    DTaP/Tdap/Td series (2 - Td or Tdap) 03/17/2031    Hepatitis C Screening  Completed    COVID-19 Vaccine  Completed    Pneumococcal 0-64 years  Aged Out        Subjective:    Plantar wart  Left foot  Onset: about 10 months ago  Tried OTC treatments, made it better but it came back  Causes pain with walking  deneis bleeding or drainage    ROS:     ROS  As stated in HPI, otherwise all others negative. The problem list was updated as a part of today's visit. Patient Active Problem List   Diagnosis Code    Prediabetes R73.03    Vitamin D deficiency E55.9    Degeneration of lumbar intervertebral disc M51.36    Displacement of lumbar intervertebral disc without myelopathy M51.26    Perimenopause N95.1       The PSH, FH were reviewed.       SH:  Social History     Tobacco Use    Smoking status: Never Smoker    Smokeless tobacco: Never Used   Substance Use Topics    Alcohol use: Never    Drug use: Never       Medications/Allergies:  Current Outpatient Medications on File Prior to Visit   Medication Sig Dispense Refill    cholecalciferol (VITAMIN D3) (50,000 UNITS /1250 MCG) capsule Take 1 Capsule by mouth every seven (7) days. 12 Capsule 1    emollient combination no.72 (Eucerin Intensive Repair) lotn Eucerin Intensive Repair lotion   apply to affected area twice a day      naproxen (NAPROSYN) 500 mg tablet Take 1 Tab by mouth two (2) times daily (with meals). (Patient not taking: Reported on 11/1/2021) 60 Tab 0     No current facility-administered medications on file prior to visit. No Known Allergies    Objective:  Visit Vitals  /71   Pulse 70   Temp 98.4 °F (36.9 °C) (Temporal)   Resp 18   Ht 5' (1.524 m)   Wt 142 lb 12.8 oz (64.8 kg)   SpO2 98%   BMI 27.89 kg/m²    Body mass index is 27.89 kg/m². Physical assessment  Physical Exam  Musculoskeletal:        Feet:    Feet:      Comments: Firm palpable area about the size of a quarter, center lesion of dried skin, no drainage          Labwork and Ancillary Studies:    CBC w/Diff  Lab Results   Component Value Date/Time    WBC 7.4 06/10/2021 10:15 AM    HGB 11.8 06/10/2021 10:15 AM    PLATELET 681 48/49/9160 10:15 AM         Basic Metabolic Profile  Lab Results   Component Value Date/Time    Sodium 142 06/10/2021 10:15 AM    Potassium 4.7 06/10/2021 10:15 AM    Chloride 105 06/10/2021 10:15 AM    CO2 25 06/10/2021 10:15 AM    Glucose 90 06/10/2021 10:15 AM    BUN 10 06/10/2021 10:15 AM    Creatinine 0.60 06/10/2021 10:15 AM    BUN/Creatinine ratio 17 06/10/2021 10:15 AM    GFR est  06/10/2021 10:15 AM    GFR est non- 06/10/2021 10:15 AM    Calcium 9.0 06/10/2021 10:15 AM        Cholesterol  Lab Results   Component Value Date/Time    Cholesterol, total 155 10/18/2019 09:37 AM    HDL Cholesterol 47 10/18/2019 09:37 AM    LDL, calculated 95 10/18/2019 09:37 AM    Triglyceride 63 10/18/2019 09:37 AM           I have discussed the diagnosis with the patient and the intended plan as seen in the above orders.   The patient has received an After-Visit Summary and questions were answered concerning future plans. An After Visit Summary was printed and given to the patient. All diagnosis have been discussed with the patient and all of the patient's questions have been answered. Follow-up and Dispositions    · Return for keep previously scheduled follow up. Aubrey Pa, [facility]  [address]  [id number]

## 2022-02-14 LAB — MAMMOGRAPHY, EXTERNAL: NEGATIVE

## 2022-03-19 PROBLEM — E55.9 VITAMIN D DEFICIENCY: Status: ACTIVE | Noted: 2021-06-10

## 2022-03-19 PROBLEM — M51.26 DISPLACEMENT OF LUMBAR INTERVERTEBRAL DISC WITHOUT MYELOPATHY: Status: ACTIVE | Noted: 2020-03-18

## 2022-03-19 PROBLEM — M51.36 DEGENERATION OF LUMBAR INTERVERTEBRAL DISC: Status: ACTIVE | Noted: 2020-03-18

## 2022-03-19 PROBLEM — R73.03 PREDIABETES: Status: ACTIVE | Noted: 2021-06-10

## 2022-03-20 PROBLEM — N95.1 PERIMENOPAUSE: Status: ACTIVE | Noted: 2021-06-10

## 2022-06-10 NOTE — PROGRESS NOTES
Patient is having neck 4/10 pain      1. Have you been to the ER, urgent care clinic since your last visit? Hospitalized since your last visit? [facility] pain     2. Have you seen or consulted any other health care providers outside of the [address] [place] [address] since your last visit? Include any pap smears or colon screening.  No     Health Maintenance Due   Topic Date Due    Hepatitis C Screening  Never done    DTaP/Tdap/Td series (1 - Tdap) Never done    PAP AKA CERVICAL CYTOLOGY  Never done    Shingrix Vaccine Age 50> (1 of 2) Never done    Colorectal Cancer Screening Combo  Never done 4 = No assist / stand by assistance

## 2022-09-19 ENCOUNTER — OFFICE VISIT (OUTPATIENT)
Dept: SPORTS MEDICINE | Age: 52
End: 2022-09-19
Payer: COMMERCIAL

## 2022-09-19 ENCOUNTER — APPOINTMENT (OUTPATIENT)
Dept: FAMILY MEDICINE CLINIC | Age: 52
End: 2022-09-19

## 2022-09-19 DIAGNOSIS — M25.50 POLYARTHRALGIA: Primary | ICD-10-CM

## 2022-09-19 DIAGNOSIS — M79.662 PAIN IN LEFT LOWER LEG: ICD-10-CM

## 2022-09-19 DIAGNOSIS — M25.50 POLYARTHRALGIA: ICD-10-CM

## 2022-09-19 DIAGNOSIS — M25.532 LEFT WRIST PAIN: ICD-10-CM

## 2022-09-19 DIAGNOSIS — M77.9 TENDINITIS: ICD-10-CM

## 2022-09-19 DIAGNOSIS — M79.642 LEFT HAND PAIN: ICD-10-CM

## 2022-09-19 DIAGNOSIS — M25.511 ACUTE PAIN OF RIGHT SHOULDER: ICD-10-CM

## 2022-09-19 PROCEDURE — 99204 OFFICE O/P NEW MOD 45 MIN: CPT | Performed by: FAMILY MEDICINE

## 2022-09-19 RX ORDER — PREDNISONE 10 MG/1
TABLET ORAL
Qty: 30 TABLET | Refills: 0 | Status: SHIPPED | OUTPATIENT
Start: 2022-09-19

## 2022-09-19 RX ORDER — ACETAMINOPHEN 500 MG
500 TABLET ORAL
COMMUNITY
Start: 2022-09-16 | End: 2022-09-26

## 2022-09-19 NOTE — PROGRESS NOTES
[facility]  Sports Medicine Consultation Note    PCP: Judge Kylah NP  Requesting provider: Judge Kylah NP       Kimberlee Simeon is a 47 y.o. female (: 1970) presenting to obtain consultative services regarding:  Chief Complaint   Patient presents with    Shoulder Pain     Right         Assessment/Plan:       ICD-10-CM ICD-9-CM   1. Polyarthralgia  M25.50 719.49   2. Tendinitis  M77.9 726.90   3. Acute pain of right shoulder  M25.511 719.41   4. Left hand pain  M79.642 729.5   5. Left wrist pain  M25.532 719.43   6. Pain in left lower leg  M79.662 729.5       Discussion:  51yo female with no significant PMHx aside from an A1c of 5.9 in 2021 presents with her son (an RN @ [employer] [facility]) for evaluation of 2-3 week history of soft tissue / joint pain, swelling and warmth. Prior to 1mo ago, no issues with joint pain. Also no family history of autoimmune problems. It began with atraumatic LEFT anterolateral sharp hip pain that caused her difficulty with active LEFT hip flexion and abduction but resolved within 1-2 days. Then 1 week ago had sharp LEFT wrist pain (points to radial aspect / first extensor compartment) that was associated with warmth and swelling. Was evaluated @ [facility], reportedly with negative radiograph(s), with a DDx of cellulitis vs gout, and was provided rx for  both indomethacin & keflex. As the pain and swelling was responsive to indomethacin, she never did take the ABx. Subsequently has a serum uric acid level drawn, which was well within normal limits. After the resolution of the LEFT wrist pain, 3 days ago had sudden onset of atraumatic RIGHT shoulder pain with any active movement. The pain was so significant that she sought emergent evaluation in the ED, had radiograph(s) which were only significant for calcific tendinopathy.     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 Plan:  Discussed further diagnostic and management options with both Kevin Prado and her son. Given the subacute history (2-3wks), severity of symptoms (limiting AROM), inflammatory symptoms and migratory asymmetric evolution (LEFT anterior hip -> LEFT wrist -> RIGHT shoulder) without evidence of infection, suspect autoimmune etiology. > autoimmune serologies  > trial of oral steroids. Discussed with pt & son re: importance of monitoring for clinical response. > referral to rheumatology for further workup.          Orders Placed This Encounter    STEVE COMPREHENSIVE PLUS PANEL     Standing Status:   Future     Number of Occurrences:   1     Standing Expiration Date:   0/12/5722    CYCLIC CITRUL PEPTIDE AB, IGG     Standing Status:   Future     Number of Occurrences:   1     Standing Expiration Date:   9/19/2023    C REACTIVE PROTEIN, QT     Standing Status:   Future     Number of Occurrences:   1     Standing Expiration Date:   9/19/2023    SED RATE (ESR)     Standing Status:   Future     Number of Occurrences:   1     Standing Expiration Date:   9/19/2023    RHEUMATOID FACTOR, IGM     Standing Status:   Future     Number of Occurrences:   1     Standing Expiration Date:   9/20/2023    CBC WITH AUTOMATED DIFF     Standing Status:   Future     Number of Occurrences:   1     Standing Expiration Date:   9/19/2023    REFERRAL TO RHEUMATOLOGY     Referral Priority:   Routine     Referral Type:   Consultation     Referral Reason:   Specialty Services Required     Referred to Provider:   Radha Hamilton MD     Number of Visits Requested:   1    predniSONE (DELTASONE) 10 mg tablet     Simg PO daily x3d, then 30mg PO daily x3d, then 20mg PO daily x3d, then 10mg PO daily x3d, then stop. Dispense:  30 Tablet     Refill:  0           Management plan & patient instructions discussed with Lillie Pichardo, who voiced understanding. Thank you for the opportunity to participate in the care of this patient. If any questions or concerns at all, please feel free to contact me. This document may have been created with the aid of dictation software. Text may contain errors, particularly phonetic errors. Elizabeth Pollard MD  Internal Medicine, Family Medicine & Sports Medicine  2022      Subjective   History:     I was asked to provide consultative services by self-referral for advice/opinion related to evaluating    Chief Complaint   Patient presents with    Shoulder Pain     Right         Lillie Pichardo is a 47 y.o. female   RIGHT hand dominant    # LEFT hip pain  Atraumatic, started suddenly  Difficulty with flexion  Pain was anterior aspect  Self-resolved within 1-2 days  Never had any issues like this before      # LEFT wrist pain  atraumatic  [facility]  + mildly warm to the touch  Son reports that UC was thinking \"cellulitis vs gout\"  Was given keflex & indomethacin. Pain improved with indomethacin, so never took the keflex. Now has some discomfort palmar aspect of LEFT hand (across MCPs)  Never had any issues like this before  Had sUA done which was within normal limits       # RIGHT shoulder pain  3 days of pain, slightly improved with NSAIDs from ED  No injury or fall  5 of 10, throbbing  No history of similar symptoms  Feels most comfortable in the fully adducted and IR position  No numbness/tingling   No swelling/redness/warmth       # LEFT lower leg pain  Just distal to medial aspect of knee  Per son Jade Fletcher one point, she was saying it was quite painful to the touch, but never had any bruising or swelling.   Then eventually it wasn't painful anymore, but just felt less sensation, like it was almost numb in that area\"      Current Medications for this complaint:  APAP  IBU  Ice    Previous work-up for this complaint has included:   RIGHT shoulder radiograph(s)      History reviewed. No pertinent past medical history. History reviewed. No pertinent surgical history. reports that she has never smoked. She has never used smokeless tobacco. She reports that she does not drink alcohol and does not use drugs. Family History   Problem Relation Age of Onset    Diabetes Mother     Hypertension Mother     Diabetes Brother     Other Father      No Known Allergies    Problem List:      Patient Active Problem List    Diagnosis    Prediabetes     2019: A1C 5.8%        Vitamin D deficiency    Perimenopause     Started 2019      Degeneration of lumbar intervertebral disc    Displacement of lumbar intervertebral disc without myelopathy       Medications:     Current Outpatient Medications on File Prior to Visit   Medication Sig Dispense Refill    naproxen (NAPROSYN) 500 mg tablet Take 1 Tab by mouth two (2) times daily (with meals). 60 Tab 0     No current facility-administered medications on file prior to visit. Objective   Physical Assessment:   VS:    Vitals:    09/19/22 1243   PainSc:   5   PainLoc: Shoulder       Physical Exam  Nursing note reviewed. Constitutional:       General: She is not in acute distress. Appearance: Normal appearance. She is well-developed. She is not diaphoretic. Comments: Holding RIGHT arm (using LEFT arm) in fully adducted and internally rotated across chest   HENT:      Head: Normocephalic and atraumatic. Mouth/Throat:      Comments: Mask in place  Eyes:      Conjunctiva/sclera: Conjunctivae normal.   Musculoskeletal:      Right shoulder: Tenderness (LHBT and anterior musculature) present. No swelling, deformity or bony tenderness.  Decreased range of motion (significantly limited AROM 2/2 p!; PROM near full). Left shoulder: No swelling, deformity, tenderness or bony tenderness. Normal range of motion. Right wrist: No effusion, tenderness, bony tenderness or snuff box tenderness. Normal range of motion. Left wrist: Swelling (mild, without erythema) present. No effusion, tenderness, bony tenderness or snuff box tenderness. Normal range of motion. Cervical back: Full passive range of motion without pain and neck supple. Right hip: No tenderness or bony tenderness. Normal range of motion. Normal strength. Left hip: No tenderness or bony tenderness. Normal range of motion. Normal strength. Right knee: No swelling or effusion. Normal range of motion. No tenderness. Left knee: No swelling or effusion. Normal range of motion. No tenderness. Legs:       Comments: Neg tinel @ bilateral elbows and wrists   Skin:     General: Skin is warm and dry. Findings: No rash. Neurological:      Mental Status: She is alert and oriented to person, place, and time. Gait: Gait normal.   Psychiatric:         Behavior: Behavior normal. Behavior is cooperative. Thought Content: Thought content normal.       Recent Labs & Imaging:       XR R shoulder (9/16/2022):   1. No acute osseous abnormality. 2. Small calcific tendinosis adjacent to the greater tuberosity. Results from Appointment encounter on 10/18/19    XR SPINE LUMB 2 OR 3 V    Narrative  Lumbar spine 2 views views. Indication: Lower back pain. Findings:    Limited Ap and lateral 2 view evaluation of the lumbar spine demonstrates  transitional lumbosacral S1 body. Normal anatomic alignment with normal disc  spaces. There is no evidence of fracture or subluxation. Osseous  mineralization is normal.  Soft tissue structures are normal.  The bowel gas  pattern is unremarkable. Impression    Transitional S1 body. No significant radiographic abnormality identified.       MRI Results (maximum last 3):  Results from Hospital Encounter encounter on 03/04/20    MRI LUMB SPINE WO CONT    Narrative  EXAMINATION: MRI LUMB SPINE WO CONT    CLINICAL INDICATION:     Lower back pain    COMPARISON:  None. TECHNIQUE: T1 and T2-weighted sagittal images of the lumbar spine were obtained  in multiple planes without contrast.    FINDINGS:  Conus medullaris tip: T12/L1  Alignment: Normal  Marrow:  Normal L1 vertebral body hemangioma  Compression deformities:  Normal  Miscellaneous findings:  None. Level by level analysis:  T12-L1, normal disc. Mild ligamentum flavum and facet hypertrophy. L1-2, Normal disc. No stenosis. L2-3, normal disc. Mild ligamentum flavum and facet hypertrophy. No stenosis. L3-4, mild disc bulge. Mild ligamentum flavum and facet hypertrophy. Mild  bilateral neural foraminal stenosis. No central spinal canal stenosis. L4-5, mild disc bulge. Moderate ligamentum flavum and facet atrophy. Mild  bilateral neural foraminal stenosis. Mild central spinal canal stenosis. L5-S1, circumferential disc bulge. Hypoplasia lumbar canal. Mild ligamentum  flavum and facet hypertrophy. Mild encroachment upon the lateral recess  bilaterally. Impression  IMPRESSION:  Disc bulges. No focal disc protrusion or high-grade stenosis. Review of Previous Medical Records:     ED note (9/16/2022): ARM PAIN     53-year-old female with no significant medical history presents to the ED for evaluation of right shoulder pain that began last night. Patient describes her pain as 8/10 and throbbing that is localized to the right shoulder. Patient does not recall any falls or mechanisms that may have incited her pain. She notes the pain is worse with any type of movement and has been holding the shoulder still at her side since last night. She has tried taking naproxen and indomethacin without significant relief of symptoms.  Patient states she has had similar types of pain recently in the bilateral knees, hands that lasted for a couple of days and was alleviated with naproxen and indomethacin. Patient states she had a uric acid level performed at an outside urgent care that was reportedly negative during these episodes. She has not followed up with her PCP for this yet. She denies any numbness, tingling, redness, swelling, wounds, rashes, chest pain, dyspnea, fevers, chills.

## 2022-09-19 NOTE — PATIENT INSTRUCTIONS
To do: - labs at the main office (down the jaim[address], last door on the right)  - start the oral steroids. .. only in the morning, with food! At minimum an 8 day taper. Pay attention to how your symptoms respond. The rheumatology office should be calling you to schedule in the next 1-2 weeks. If you don't hear from them after 2 weeks, call their office directly to check on the status of your referral.       VISIT SURVEY       You may receive a survey regarding your visit today either by mail or email. Please take the opportunity to let us know how we did. This information helps us continue to improve and provide a great patient experience. TESTING / IMAGING RESULTS       If you have [employer] access:  Per federal regulations all of your results will be released to you and to your physician / provider simultaneously on [address] This means that it is likely that you will have the opportunity to review your results before your physician / provider! Since all \"critical\" abnormal results are immediately called to the office / on-call providers on nights, weekends and holidays - please refrain from calling after hours when you receive abnormal results through [address] Instead, allow time for your physician / provider to review your results and then provide interpretation and/or guidance. If the results are significantly abnormal and require time-sensitive action - guidance will be provided both via [employer] and via telephone. For all other results (interpreted as \"normal\", \"abnormal but expected\", \"reassuring / stable\", \"slightly abnormal\") - non-urgent guidance will be provided via [employer] and [employer] communication only. [employer] Help Desk # [phone]    If you do NOT have [employer] access: If the results are significantly abnormal and require time-sensitive action - guidance will be relayed to you via telephone.   For all other results (interpreted as \"normal\", \"abnormal but expected\", \"reassuring / stable\", \"slightly abnormal\") - non-urgent guidance will be mailed to you via [employer]      Results from Outside Facilities / Laboratories:  Results of tests performed at outside facilities / laboratories likely will not appear in the [employer]. They may appear in the patient portals of those outside facilities / laboratories. Please keep in mind that with your access to your patient portal directly to an outside facility / laboratory, you are likely viewing results before your physician / provider! Please allow time for your physician / provider to review your results and then provide interpretation and/or guidance. If you have questions about your results beyond the guidance provided in MyChart or in your results letter, please schedule a follow up appointment to discuss with your physician / provider. MEDICATION REFILLS        Please request medication refills through your pharmacy, to ensure the correct pharmacy is used. Please allow at least 3 business days for refill requests to be addressed. Refills will not be provided by the after hours/on call provider.

## 2022-09-20 LAB
ABSOLUTE LYMPHOCYTE COUNT, 10803: 2.1 K/UL (ref 1–4.8)
ANTI-DNA (DS) AB QN, 1189: <1 IU/ML
BASOPHILS # BLD: 0 K/UL (ref 0–0.2)
BASOPHILS NFR BLD: 1 % (ref 0–2)
C-REACTIVE PROTEIN, QT, 006627: 1.9 MG/DL
CCP ANTIBODY IGG,99138601: 23.4 U/ML
CENTROMERE B ANTIBODY, 601143: <0.2 AI
CHROMATIN ANTIBODY: <0.2 AI
ENA SS-A AB SER-ACNC: <0.2 AI
ENA SS-B AB SER-ACNC: <0.2 AI
EOSINOPHIL # BLD: 0.1 K/UL (ref 0–0.5)
EOSINOPHIL NFR BLD: 2 % (ref 0–6)
ERYTHROCYTE [DISTWIDTH] IN BLOOD BY AUTOMATED COUNT: 13.5 % (ref 10–15.5)
GRANULOCYTES,GRANS: 63 % (ref 40–75)
HCT VFR BLD AUTO: 37.3 % (ref 35.1–48)
HGB BLD-MCNC: 11.5 G/DL (ref 11.7–16)
JO1 ANTIBODY, 8107: <0.2 AI
LYMPHOCYTES, LYMLT: 27 % (ref 20–45)
MCH RBC QN AUTO: 28 PG (ref 26–34)
MCHC RBC AUTO-ENTMCNC: 31 G/DL (ref 31–36)
MCV RBC AUTO: 91 FL (ref 80–99)
MONOCYTES # BLD: 0.6 K/UL (ref 0.1–1)
MONOCYTES NFR BLD: 8 % (ref 3–12)
NEUTROPHILS # BLD AUTO: 4.9 K/UL (ref 1.8–7.7)
PLATELET # BLD AUTO: 358 K/UL (ref 140–440)
PMV BLD AUTO: 10.9 FL (ref 9–13)
RBC # BLD AUTO: 4.09 M/UL (ref 3.8–5.2)
RHEUMATOID FACTOR QUANT, IMMUNOTURBIDIMETRIC: <20 IU/ML (ref 0–20)
RNP ABS, 016354: <0.2 AI
SCLERODERMA AB (SCL-70), 601116: <0.2 AI
SED RATE (ESR): 47 MM/HR
SMITH ABS, 016362: <0.2 AI
WBC # BLD AUTO: 7.7 K/UL (ref 4–11)

## 2022-09-23 PROBLEM — R76.8 POSITIVE ANTI-CCP TEST: Status: ACTIVE | Noted: 2022-09-23

## 2022-09-23 NOTE — PROGRESS NOTES
Called and spoke with patient's daughter. Informed her of lab results showing increased inflammatory markers (CRP, ESR) as well as a significantly elevated anti-CCP. She reports that patient notes improved shoulder pain however interestingly enough, episodic pain in the wrist.  Is planning on taking entire steroid taper as prescribed. Informed dtr that additional lab results will be faxed to [facility] to hopefully expedite the scheduling of her consultation. Dtr voiced understanding and appreciation.

## 2023-05-18 LAB — MAMMOGRAPHY, EXTERNAL: NEGATIVE

## 2023-12-01 ENCOUNTER — COMMUNITY OUTREACH (OUTPATIENT)
Facility: CLINIC | Age: 53
End: 2023-12-01